# Patient Record
Sex: FEMALE | Race: WHITE | NOT HISPANIC OR LATINO
[De-identification: names, ages, dates, MRNs, and addresses within clinical notes are randomized per-mention and may not be internally consistent; named-entity substitution may affect disease eponyms.]

---

## 2018-07-16 PROBLEM — Z00.00 ENCOUNTER FOR PREVENTIVE HEALTH EXAMINATION: Status: ACTIVE | Noted: 2018-07-16

## 2018-07-20 ENCOUNTER — APPOINTMENT (OUTPATIENT)
Dept: BREAST CENTER | Facility: CLINIC | Age: 58
End: 2018-07-20
Payer: COMMERCIAL

## 2018-07-20 VITALS
TEMPERATURE: 98.9 F | DIASTOLIC BLOOD PRESSURE: 82 MMHG | HEART RATE: 76 BPM | OXYGEN SATURATION: 95 % | RESPIRATION RATE: 18 BRPM | HEIGHT: 67 IN | SYSTOLIC BLOOD PRESSURE: 142 MMHG | BODY MASS INDEX: 33.59 KG/M2 | WEIGHT: 214 LBS

## 2018-07-20 PROCEDURE — 99203 OFFICE O/P NEW LOW 30 MIN: CPT

## 2018-11-30 ENCOUNTER — RESULT REVIEW (OUTPATIENT)
Age: 58
End: 2018-11-30

## 2019-01-03 ENCOUNTER — OTHER (OUTPATIENT)
Age: 59
End: 2019-01-03

## 2019-01-03 ENCOUNTER — RESULT REVIEW (OUTPATIENT)
Age: 59
End: 2019-01-03

## 2019-01-03 ENCOUNTER — APPOINTMENT (OUTPATIENT)
Dept: HEMATOLOGY ONCOLOGY | Facility: CLINIC | Age: 59
End: 2019-01-03
Payer: COMMERCIAL

## 2019-01-03 VITALS
SYSTOLIC BLOOD PRESSURE: 130 MMHG | RESPIRATION RATE: 18 BRPM | OXYGEN SATURATION: 97 % | BODY MASS INDEX: 32.72 KG/M2 | HEART RATE: 73 BPM | WEIGHT: 206 LBS | TEMPERATURE: 98.4 F | HEIGHT: 66.54 IN | DIASTOLIC BLOOD PRESSURE: 78 MMHG

## 2019-01-03 DIAGNOSIS — Z86.69 PERSONAL HISTORY OF OTHER DISEASES OF THE NERVOUS SYSTEM AND SENSE ORGANS: ICD-10-CM

## 2019-01-03 DIAGNOSIS — I25.2 OLD MYOCARDIAL INFARCTION: ICD-10-CM

## 2019-01-03 PROCEDURE — 99245 OFF/OP CONSLTJ NEW/EST HI 55: CPT

## 2019-01-03 RX ORDER — PENICILLIN V POTASSIUM 500 MG/1
TABLET, FILM COATED ORAL
Refills: 0 | Status: DISCONTINUED | COMMUNITY
End: 2019-01-03

## 2019-01-03 NOTE — HISTORY OF PRESENT ILLNESS
[Disease: _____________________] : Disease: [unfilled] [T: ___] : T[unfilled] [N: ___] : N[unfilled] [de-identified] : Mrs. Canchola is a 58 year old female who presents for second opinion for T1bNo Breast Cancer s/p right lumpectomy and radiation therapy. \par Patient was diagnosed with breast cancer - stage T1C N0- ER /VA 99% positive, HER2 christiana - not amplified, Oncotype Dx 8.  She completed RT and was initiated on tamoxifen as the hormones level was c/w premenopausal status.\par She didn’t tolerate side effects of  tamoxifen, was feeling foggy and was concern about risk of thrombosis and stopped the medication.  \par She is considering oophorectomy and AI.  \par She has family h/o BRCA 2 mutation in her sister who was tested after she was diagnosed with ovarian cancer. Patient tested negative for BRCA mutation in the past but she is not sure if she had extended panel.

## 2019-01-03 NOTE — PHYSICAL EXAM
[Fully active, able to carry on all pre-disease performance without restriction] : Status 0 - Fully active, able to carry on all pre-disease performance without restriction [Normal] : affect appropriate [de-identified] : scar post lumpectomy

## 2019-01-03 NOTE — ASSESSMENT
[FreeTextEntry1] : Breast cancer stage 1, Oncotype Dx8\par Plan:\par - hypercoagulable w/u in view of patient h/o miscarriages, infertility, early MI and family h/o early CVA\par - genetic testing \par - check FSH, LH, estradiol\par - gyn onc evaluation for oophorectomy\par - d/w patient indication for prophylactic tx for risk reduction

## 2019-01-03 NOTE — CONSULT LETTER
[Dear  ___] : Dear  [unfilled], [Consult Letter:] : I had the pleasure of evaluating your patient, [unfilled]. [Please see my note below.] : Please see my note below. [Consult Closing:] : Thank you very much for allowing me to participate in the care of this patient.  If you have any questions, please do not hesitate to contact me. [Sincerely,] : Sincerely, [FreeTextEntry3] : Desi Ferrer MD\par [facility] [place] at [facility]\par

## 2019-01-24 ENCOUNTER — APPOINTMENT (OUTPATIENT)
Dept: HEMATOLOGY ONCOLOGY | Facility: CLINIC | Age: 59
End: 2019-01-24
Payer: COMMERCIAL

## 2019-01-24 VITALS
HEIGHT: 66.54 IN | SYSTOLIC BLOOD PRESSURE: 134 MMHG | WEIGHT: 208 LBS | HEART RATE: 83 BPM | DIASTOLIC BLOOD PRESSURE: 75 MMHG | BODY MASS INDEX: 33.03 KG/M2 | OXYGEN SATURATION: 96 % | TEMPERATURE: 98.2 F | RESPIRATION RATE: 18 BRPM

## 2019-01-24 DIAGNOSIS — Z80.3 FAMILY HISTORY OF MALIGNANT NEOPLASM OF BREAST: ICD-10-CM

## 2019-01-24 DIAGNOSIS — Z80.41 FAMILY HISTORY OF MALIGNANT NEOPLASM OF OVARY: ICD-10-CM

## 2019-01-24 PROCEDURE — 99214 OFFICE O/P EST MOD 30 MIN: CPT

## 2019-01-29 ENCOUNTER — OTHER (OUTPATIENT)
Age: 59
End: 2019-01-29

## 2019-01-29 PROBLEM — Z80.3 FAMILY HISTORY OF MALIGNANT NEOPLASM OF BREAST: Status: ACTIVE | Noted: 2019-01-03

## 2019-01-29 PROBLEM — Z80.41 FAMILY HISTORY OF MALIGNANT NEOPLASM OF OVARY: Status: ACTIVE | Noted: 2019-01-03

## 2019-01-29 NOTE — HISTORY OF PRESENT ILLNESS
[Disease: _____________________] : Disease: [unfilled] [T: ___] : T[unfilled] [N: ___] : N[unfilled] [de-identified] : Mrs. Canchola is a 58 year old female who presents for second opinion for T1bNo Breast Cancer s/p right lumpectomy and radiation therapy. \par Patient was diagnosed with breast cancer - stage T1C N0- ER /MT 99% positive, HER2 christiana - not amplified, Oncotype Dx 8.  She completed RT and was initiated on tamoxifen as the hormones level was c/w premenopausal status.\par She didn’t tolerate side effects of  tamoxifen, was feeling foggy and was concern about risk of thrombosis and stopped the medication.  \par She is considering oophorectomy and AI.  \par She has family h/o BRCA 2 mutation in her sister who was tested after she was diagnosed with ovarian cancer. Patient tested negative for BRCA mutation in the past but she is not sure if she had extended panel.  [de-identified] : Patient presents today for follow up of newly diagnosed breast cancer.  She still has complaints of fatigue.

## 2019-01-29 NOTE — CONSULT LETTER
[Dear  ___] : Dear  [unfilled], [Consult Letter:] : I had the pleasure of evaluating your patient, [unfilled]. [Please see my note below.] : Please see my note below. [Consult Closing:] : Thank you very much for allowing me to participate in the care of this patient.  If you have any questions, please do not hesitate to contact me. [Sincerely,] : Sincerely, [FreeTextEntry3] : Desi Ferrer MD\par [facility] [place] at [facility]\par

## 2019-01-29 NOTE — ASSESSMENT
[FreeTextEntry1] : Breast cancer stage 1, Oncotype Dx8\par Plan:\par - hypercoagulable w/u in view of patient h/o miscarriages, infertility, early MI and family h/o early CVA completed- negative \par - genetic testing - negative for breast cancer panel \par -  FSH 36 and LH 18 -  c/w postmenopausal, \par - gyn onc evaluation for oophorectomy - patient is going to [facility] \par - d/w patient indication for prophylactic tx for risk reduction - start Anastrozole 1mg \par Side effects and monitoring parameters d/w patient \par - Discussed weight control and healthy eating habits.  Encourage to participate in moderate exercise.\par - bone density 5/16- WNL, due now

## 2019-01-29 NOTE — PHYSICAL EXAM
[Fully active, able to carry on all pre-disease performance without restriction] : Status 0 - Fully active, able to carry on all pre-disease performance without restriction [Normal] : affect appropriate [de-identified] : scar post lumpectomy

## 2019-02-11 ENCOUNTER — OTHER (OUTPATIENT)
Age: 59
End: 2019-02-11

## 2019-02-20 ENCOUNTER — RX RENEWAL (OUTPATIENT)
Age: 59
End: 2019-02-20

## 2019-02-20 RX ORDER — ANASTROZOLE TABLETS 1 MG/1
1 TABLET ORAL DAILY
Qty: 90 | Refills: 1 | Status: DISCONTINUED | COMMUNITY
Start: 2019-01-24 | End: 2019-02-20

## 2019-03-14 ENCOUNTER — RESULT REVIEW (OUTPATIENT)
Age: 59
End: 2019-03-14

## 2019-03-14 ENCOUNTER — APPOINTMENT (OUTPATIENT)
Dept: HEMATOLOGY ONCOLOGY | Facility: CLINIC | Age: 59
End: 2019-03-14
Payer: COMMERCIAL

## 2019-03-14 VITALS
HEART RATE: 85 BPM | BODY MASS INDEX: 33.35 KG/M2 | DIASTOLIC BLOOD PRESSURE: 77 MMHG | HEIGHT: 66.54 IN | SYSTOLIC BLOOD PRESSURE: 136 MMHG | WEIGHT: 210 LBS | TEMPERATURE: 98.4 F | OXYGEN SATURATION: 95 % | RESPIRATION RATE: 18 BRPM

## 2019-03-14 DIAGNOSIS — R23.2 FLUSHING: ICD-10-CM

## 2019-03-14 DIAGNOSIS — T45.1X5A FLUSHING: ICD-10-CM

## 2019-03-14 PROCEDURE — 99213 OFFICE O/P EST LOW 20 MIN: CPT

## 2019-03-15 NOTE — ASSESSMENT
[FreeTextEntry1] : Breast cancer stage 1, Oncotype Dx8\par Plan:\par - hypercoagulable w/u in view of patient h/o miscarriages, infertility, early MI and family h/o early CVA completed- negative \par - genetic testing - negative for breast cancer panel \par -  FSH 36 and LH 18 -  c/w postmenopausal, \par - gyn onc evaluation for oophorectomy - patient is going to [facility] \par - continue Letrozole 2.5 mg daily.  Reviewed side effects, toxicities and benefits. \par - hot flashes/night sweats; discussed options including Gabapentin, SSRI.  declining pharmacotherapy at this point.  Will try fans and light clothing.\par - Discussed weight control and healthy eating habits.  Encourage to participate in moderate exercise.Pt is using treadmill daily and cutting down on carbs.\par - Monitor CBC,Chem Profile, CA 27-29, CEA, Estradiol, FSH\par - bone density 5/16-, ordered and pending.\par - continue routine health maintenance; colonoscopy, lipid profile, gyn visit.\par - History of present illness, review of systems, physical exam and treatment plan reviewed with . \par - Office visit in 12 weeks or prn for new or worsening symptoms. \par - CBC,Chem Profile, CEA, Ca27-29 at next visit.

## 2019-03-15 NOTE — PHYSICAL EXAM
[Fully active, able to carry on all pre-disease performance without restriction] : Status 0 - Fully active, able to carry on all pre-disease performance without restriction [Normal] : normal spine exam without palpable tenderness, no kyphosis or scoliosis [de-identified] : scar post lumpectomy, and radiation, hyperpigmentation, slight swelling.

## 2019-03-15 NOTE — HISTORY OF PRESENT ILLNESS
[Disease: _____________________] : Disease: [unfilled] [T: ___] : T[unfilled] [N: ___] : N[unfilled] [de-identified] : Mrs. Canchola is a 58 year old female who presents for second opinion for T1bNo Breast Cancer s/p right lumpectomy and radiation therapy. \par Patient was diagnosed with breast cancer - stage T1C N0- ER /VA 99% positive, HER2 christiana - not amplified, Oncotype Dx 8.  She completed RT and was initiated on tamoxifen as the hormones level was c/w premenopausal status.\par She didn’t tolerate side effects of  tamoxifen, was feeling foggy and was concern about risk of thrombosis and stopped the medication.  \par She is considering oophorectomy and AI.  \par She has family h/o BRCA 2 mutation in her sister who was tested after she was diagnosed with ovarian cancer. Patient tested negative for BRCA mutation in the past but she is not sure if she had extended panel.  [FreeTextEntry1] : Letrozole 2.5 mg PO daily. [de-identified] : Patient presents for follow up  on Letrozole for of newly diagnosed breast cancer. She Is tolerating it well, however, she is having some hot flashes and night sweats. She doesn't feel that she needs any medication for it. She is scheduled for mammography in July.

## 2019-03-15 NOTE — REVIEW OF SYSTEMS
[Negative] : Allergic/Immunologic [Recent Change In Weight] : ~T recent weight change [Hot Flashes] : hot flashes [Fever] : no fever [Fatigue] : no fatigue [Eye Pain] : no eye pain [Vision Problems] : no vision problems [Dysphagia] : no dysphagia [Hoarseness] : no hoarseness [Chest Pain] : no chest pain [Lower Ext Edema] : no lower extremity edema [Shortness Of Breath] : no shortness of breath [Cough] : no cough [Constipation] : no constipation [Diarrhea] : no diarrhea [Dysuria] : no dysuria [Joint Pain] : no joint pain [Muscle Pain] : no muscle pain [Skin Rash] : no skin rash [Dizziness] : no dizziness [Anxiety] : no anxiety [Depression] : no depression [Easy Bleeding] : no tendency for easy bleeding [Easy Bruising] : no tendency for easy bruising [FreeTextEntry2] : Gained 2 lbs but trying to lose weight

## 2019-03-20 ENCOUNTER — RECORD ABSTRACTING (OUTPATIENT)
Age: 59
End: 2019-03-20

## 2019-03-20 DIAGNOSIS — Z83.3 FAMILY HISTORY OF DIABETES MELLITUS: ICD-10-CM

## 2019-03-20 DIAGNOSIS — Z86.018 PERSONAL HISTORY OF OTHER BENIGN NEOPLASM: ICD-10-CM

## 2019-03-20 DIAGNOSIS — Z78.9 OTHER SPECIFIED HEALTH STATUS: ICD-10-CM

## 2019-03-20 DIAGNOSIS — Z92.3 PERSONAL HISTORY OF IRRADIATION: ICD-10-CM

## 2019-03-20 DIAGNOSIS — Z82.69 FAMILY HISTORY OF OTHER DISEASES OF THE MUSCULOSKELETAL SYSTEM AND CONNECTIVE TISSUE: ICD-10-CM

## 2019-03-20 DIAGNOSIS — Z80.8 FAMILY HISTORY OF MALIGNANT NEOPLASM OF OTHER ORGANS OR SYSTEMS: ICD-10-CM

## 2019-03-20 LAB — CYTOLOGY CVX/VAG DOC THIN PREP: NORMAL

## 2019-03-28 ENCOUNTER — RX RENEWAL (OUTPATIENT)
Age: 59
End: 2019-03-28

## 2019-04-10 ENCOUNTER — RX RENEWAL (OUTPATIENT)
Age: 59
End: 2019-04-10

## 2019-04-11 ENCOUNTER — OTHER (OUTPATIENT)
Age: 59
End: 2019-04-11

## 2019-04-25 ENCOUNTER — APPOINTMENT (OUTPATIENT)
Dept: HEMATOLOGY ONCOLOGY | Facility: CLINIC | Age: 59
End: 2019-04-25

## 2019-04-29 ENCOUNTER — OTHER (OUTPATIENT)
Age: 59
End: 2019-04-29

## 2019-06-28 ENCOUNTER — APPOINTMENT (OUTPATIENT)
Dept: OBGYN | Facility: CLINIC | Age: 59
End: 2019-06-28

## 2019-08-14 NOTE — DISCUSSION/SUMMARY
[Cancer Type / Location / Histology Subtype: ________] : Cancer Type / Location / Histology Subtype: [unfilled]

## 2019-08-21 ENCOUNTER — RESULT REVIEW (OUTPATIENT)
Age: 59
End: 2019-08-21

## 2019-08-21 ENCOUNTER — APPOINTMENT (OUTPATIENT)
Dept: HEMATOLOGY ONCOLOGY | Facility: CLINIC | Age: 59
End: 2019-08-21
Payer: COMMERCIAL

## 2019-08-21 VITALS
DIASTOLIC BLOOD PRESSURE: 64 MMHG | HEIGHT: 66.54 IN | OXYGEN SATURATION: 95 % | RESPIRATION RATE: 18 BRPM | TEMPERATURE: 98.5 F | HEART RATE: 72 BPM | BODY MASS INDEX: 34.14 KG/M2 | WEIGHT: 215 LBS | SYSTOLIC BLOOD PRESSURE: 116 MMHG

## 2019-08-21 DIAGNOSIS — E66.9 OBESITY, UNSPECIFIED: ICD-10-CM

## 2019-08-21 DIAGNOSIS — C50.919 MALIGNANT NEOPLASM OF UNSPECIFIED SITE OF UNSPECIFIED FEMALE BREAST: ICD-10-CM

## 2019-08-21 DIAGNOSIS — K76.0 FATTY (CHANGE OF) LIVER, NOT ELSEWHERE CLASSIFIED: ICD-10-CM

## 2019-08-21 PROCEDURE — 99214 OFFICE O/P EST MOD 30 MIN: CPT

## 2019-08-21 RX ORDER — LETROZOLE TABLETS 2.5 MG/1
2.5 TABLET, FILM COATED ORAL DAILY
Qty: 90 | Refills: 1 | Status: COMPLETED | COMMUNITY
Start: 2019-02-20 | End: 2019-08-21

## 2019-08-21 NOTE — REVIEW OF SYSTEMS
[Insomnia] : insomnia [Hot Flashes] : hot flashes [Recent Change In Weight] : ~T recent weight change [Negative] : Heme/Lymph [Fever] : no fever [Fatigue] : no fatigue [Eye Pain] : no eye pain [Dysphagia] : no dysphagia [Vision Problems] : no vision problems [Chest Pain] : no chest pain [Hoarseness] : no hoarseness [Shortness Of Breath] : no shortness of breath [Lower Ext Edema] : no lower extremity edema [Cough] : no cough [Diarrhea] : no diarrhea [Constipation] : no constipation [Joint Pain] : no joint pain [Dysuria] : no dysuria [Dizziness] : no dizziness [Muscle Pain] : no muscle pain [Skin Rash] : no skin rash [Depression] : no depression [Anxiety] : no anxiety [Easy Bruising] : no tendency for easy bruising [Easy Bleeding] : no tendency for easy bleeding [FreeTextEntry2] : Gained 2 lbs but trying to lose weight [de-identified] : pt has issues staying asleep [de-identified] : occasional hot flashes

## 2019-08-21 NOTE — ASSESSMENT
[FreeTextEntry1] : Breast cancer stage 1, Oncotype Dx8\par Plan:\par - hypercoagulable w/u in view of patient h/o miscarriages, infertility, early MI and family h/o early CVA completed- negative \par - genetic testing - negative for breast cancer panel \par -  FSH 36 and LH 18 -  c/w postmenopausal, \par - gyn onc evaluation for oophorectomy - patient is going to [facility] [place] \par - Tamoxifen - start 10 mg - build up to 20 mg, \par - hot flashes/night sweats; discussed options including Gabapentin, SSRI.  declining pharmacotherapy at this point.  Will try fans and light clothing.\par - Discussed weight control and healthy eating habits.  Encourage to participate in moderate exercise.Pt is using treadmill daily and cutting down on carbs.\par - Monitor CBC,Chem Profile, CA 27-29, CEA, Estradiol, FSH\par - bone density 5/16-, ordered and pending.\par - continue routine health maintenance; colonoscopy, lipid profile, gyn visit.\par - Office visit in 12 weeks or prn for new or worsening symptoms. \par - CBC,Chem Profile, CEA, Ca27-29 at next visit.

## 2019-08-21 NOTE — HISTORY OF PRESENT ILLNESS
[Disease: _____________________] : Disease: [unfilled] [T: ___] : T[unfilled] [N: ___] : N[unfilled] [de-identified] : Mrs. Canchola is a 58 year old female who presents for second opinion for T1bNo Breast Cancer s/p right lumpectomy and radiation therapy. \par Patient was diagnosed with breast cancer - stage T1C N0- ER /OK 99% positive, HER2 christiana - not amplified, Oncotype Dx 8.  She completed RT and was initiated on tamoxifen as the hormones level was c/w premenopausal status.\par She didn’t tolerate side effects of  tamoxifen, was feeling foggy and was concern about risk of thrombosis and stopped the medication.  \par She is considering oophorectomy and AI.  \par She has family h/o BRCA 2 mutation in her sister who was tested after she was diagnosed with ovarian cancer. Patient tested negative for BRCA mutation in the past but she is not sure if she had extended panel.  [de-identified] : Patient presents for follow up  on Letrozole for of newly diagnosed breast cancer. She Is tolerating it well, however, she is having some hot flashes and night sweats. \par She was unable to resume in May - chest pressure and bone pain which took long time to go away.  Struggling with taking off weight .  Weight at age 20- 32  127,  decreased level of activity.   She was active recently in [place].  Eats well but too much by volume.  \par \par Patient underwent left ovariectomy- tumor removed leiomyoma olive size.  \par

## 2019-08-21 NOTE — PHYSICAL EXAM
[Fully active, able to carry on all pre-disease performance without restriction] : Status 0 - Fully active, able to carry on all pre-disease performance without restriction [Normal] : affect appropriate [de-identified] : scar post lumpectomy, and radiation, hyperpigmentation, slight swelling.

## 2019-10-02 NOTE — DISCUSSION/SUMMARY
[Cancer Type / Location / Histology Subtype: ________] : Cancer Type / Location / Histology Subtype: [unfilled] [Diagnosis Date (year): ____] : Diagnosis Date (year): [unfilled] [I] : I [Surgery] : Surgery: Yes [Surgery Date(s) (year): ____] : Surgery Date(s) (year): [unfilled] [Surgical Procedure / Location / Findings: _________] : Surgical Procedure / Location / Findings: [unfilled] [Radiation] : Radiation: Yes [Body Area Treated: _________] : Body Area Treated: [unfilled] [Systemic Therapy (chemotherapy, hormonal therapy, other)] : Systemic Therapy (chemotherapy, hormonal therapy, other): Yes [Persistent symptoms or side effects at completion of treatment?  If Yes, list types ___] : Persistent symptoms or side effects at completion of treatment? Yes, [unfilled] [Genetic / hereditary risk factor(s) or predisposing conditions: __________] : Genetic / hereditary risk factor(s) or predisposing conditions: [unfilled] [Genetic Counseling] : Genetic Counseling: Yes [Follow up with Oncologist in _____] : Follow up with Oncologist in [unfilled] [Follow up with Surgeon in _____] : Follow up with Surgeon in [unfilled] [Follow up with Radiation MD in _____] : Follow up with Radiation MD in [unfilled] [Bloodwork: ______] : Bloodwork: [unfilled] [Scans: ______] : Scans: [unfilled] [Primary care physician] : primary care physician [Colonoscopy] : colonoscopy [Mammogram] : Mammogram [Skin Checks] : skin checks [PAP Test] : PAP test [Bone Density Test] : bone density test [Annual Flu Shot] : annual flu shot [Cholesterol Test] : cholesterol test [Emotional and mental health] : Emotional and mental health [Physical Functioning] : Physical functioning [Weight Changes] : Weight changes [Diet] : Diet [Path to Wellness Survivorship Program] : Path to Wellness Survivorship Program [Bridge to Survivorship Breast Cancer] : Bridge to Survivorship Breast Cancer [FreeTextEntry1] : Tamoxifen\par Anastrazole\par Letrozole [FreeTextEntry2] : Left oophorectomy for leiomyoma [FreeTextEntry8] : Araceli Perez [FreeTextEntry9] : 10/2/2019

## 2019-11-26 ENCOUNTER — APPOINTMENT (OUTPATIENT)
Dept: HEMATOLOGY ONCOLOGY | Facility: CLINIC | Age: 59
End: 2019-11-26

## 2022-05-13 ENCOUNTER — APPOINTMENT (OUTPATIENT)
Dept: OBGYN | Facility: CLINIC | Age: 62
End: 2022-05-13
Payer: COMMERCIAL

## 2022-05-13 ENCOUNTER — NON-APPOINTMENT (OUTPATIENT)
Age: 62
End: 2022-05-13

## 2022-05-13 VITALS
WEIGHT: 223 LBS | HEIGHT: 66.54 IN | DIASTOLIC BLOOD PRESSURE: 70 MMHG | SYSTOLIC BLOOD PRESSURE: 130 MMHG | BODY MASS INDEX: 35.42 KG/M2

## 2022-05-13 DIAGNOSIS — Z01.419 ENCOUNTER FOR GYNECOLOGICAL EXAMINATION (GENERAL) (ROUTINE) W/OUT ABNORMAL FINDINGS: ICD-10-CM

## 2022-05-13 PROCEDURE — 99396 PREV VISIT EST AGE 40-64: CPT

## 2022-05-13 RX ORDER — TAMOXIFEN CITRATE 10 MG/1
10 TABLET, FILM COATED ORAL
Qty: 30 | Refills: 2 | Status: DISCONTINUED | COMMUNITY
Start: 2019-08-21 | End: 2022-05-13

## 2022-05-13 RX ORDER — TAMOXIFEN CITRATE 20 MG/1
20 TABLET, FILM COATED ORAL DAILY
Refills: 0 | Status: DISCONTINUED | COMMUNITY
End: 2022-05-13

## 2022-05-14 LAB — HPV HIGH+LOW RISK DNA PNL CVX: NOT DETECTED

## 2022-05-20 LAB — CYTOLOGY CVX/VAG DOC THIN PREP: NORMAL

## 2022-07-13 ENCOUNTER — APPOINTMENT (OUTPATIENT)
Dept: GASTROENTEROLOGY | Facility: CLINIC | Age: 62
End: 2022-07-13

## 2022-07-13 VITALS
DIASTOLIC BLOOD PRESSURE: 62 MMHG | HEIGHT: 66 IN | HEART RATE: 75 BPM | WEIGHT: 222 LBS | SYSTOLIC BLOOD PRESSURE: 104 MMHG | OXYGEN SATURATION: 98 % | BODY MASS INDEX: 35.68 KG/M2

## 2022-07-13 DIAGNOSIS — Z12.11 ENCOUNTER FOR SCREENING FOR MALIGNANT NEOPLASM OF COLON: ICD-10-CM

## 2022-07-13 PROCEDURE — 99203 OFFICE O/P NEW LOW 30 MIN: CPT

## 2022-07-13 RX ORDER — CHOLECALCIFEROL (VITAMIN D3) 50 MCG
400 CAPSULE ORAL
Refills: 0 | Status: DISCONTINUED | COMMUNITY
End: 2022-07-13

## 2022-07-13 NOTE — PHYSICAL EXAM
[General Appearance - Alert] : alert [General Appearance - In No Acute Distress] : in no acute distress [Sclera] : the sclera and conjunctiva were normal [Outer Ear] : the ears and nose were normal in appearance [Neck Appearance] : the appearance of the neck was normal [] : no respiratory distress [Apical Impulse] : the apical impulse was normal [Abdomen Soft] : soft [Abdomen Tenderness] : non-tender [Skin Color & Pigmentation] : normal skin color and pigmentation [Oriented To Time, Place, And Person] : oriented to person, place, and time [FreeTextEntry1] : deferred [Abnormal Walk] : normal gait

## 2022-07-13 NOTE — HISTORY OF PRESENT ILLNESS
[FreeTextEntry1] : 62 year F h/o breast ca s/p lumpectomy/ radiation follows at [facility] [place], right ovary and uterus removed due to large fibroid, then left ovary/tube removed prophylactically, known benign abdominal mass/monitored with imaging presents for evaluation of colon cancer screening.  She is seen at the request of Dr. La Nena Cardoso. \par \par Last colonoscopy 7 years ago 2 polyps, told to repeat in 5 years .\par \par 2 weeks ago she had CT for follow up of benign mass in her abdomen. \par \par She is exercising regularly. \par \par Patient denies abdominal pain , n/v/heartburn, reflux, dysphagia/odynophagia, change in bowel habits, diarrhea, constipation, brbpr, melena. no weight loss.  Good appetite. Patient has daily BM, denies regular NSAID use. \par \par patient is BRCA negative. \par \par sister -ovarian ca. BRCA postivive\par fam hx negative for colon polyps, colon cancer

## 2022-07-13 NOTE — ASSESSMENT
[FreeTextEntry1] : Screening colonoscopy, patient due\par Risks (including but not limited to sedation risks, infection, bleeding, perforation, possibility of missed lesions), benefits and alternatives to procedure, including not doing the procedure, were discussed with patient. Patient understood and agreed to proceed with colonoscopy. \par Colonoscopy preparation instructions reviewed with patient.\par 2 Dulcolax two days prior to procedure + Split MiraLAX/Dulcolax preparation \par \par patient will forward CT result showing benign abd mass\par \par

## 2022-12-14 ENCOUNTER — NON-APPOINTMENT (OUTPATIENT)
Age: 62
End: 2022-12-14

## 2023-03-31 ENCOUNTER — APPOINTMENT (OUTPATIENT)
Dept: ENDOCRINOLOGY | Facility: CLINIC | Age: 63
End: 2023-03-31

## 2023-05-11 ENCOUNTER — APPOINTMENT (OUTPATIENT)
Dept: GASTROENTEROLOGY | Facility: HOSPITAL | Age: 63
End: 2023-05-11

## 2023-10-01 PROBLEM — Z92.3 HISTORY OF RADIATION THERAPY: Status: RESOLVED | Noted: 2019-03-20 | Resolved: 2023-10-01
